# Patient Record
Sex: MALE | Race: WHITE | Employment: UNEMPLOYED | URBAN - METROPOLITAN AREA
[De-identification: names, ages, dates, MRNs, and addresses within clinical notes are randomized per-mention and may not be internally consistent; named-entity substitution may affect disease eponyms.]

---

## 2017-03-18 ENCOUNTER — APPOINTMENT (OUTPATIENT)
Dept: MRI IMAGING | Facility: HOSPITAL | Age: 32
End: 2017-03-18
Attending: EMERGENCY MEDICINE
Payer: COMMERCIAL

## 2017-03-18 ENCOUNTER — HOSPITAL ENCOUNTER (EMERGENCY)
Facility: HOSPITAL | Age: 32
Discharge: HOME | End: 2017-03-18
Attending: EMERGENCY MEDICINE
Payer: COMMERCIAL

## 2017-03-18 VITALS
WEIGHT: 265 LBS | TEMPERATURE: 98 F | HEIGHT: 71 IN | SYSTOLIC BLOOD PRESSURE: 149 MMHG | DIASTOLIC BLOOD PRESSURE: 84 MMHG | HEART RATE: 81 BPM | OXYGEN SATURATION: 99 % | RESPIRATION RATE: 20 BRPM | BODY MASS INDEX: 37.1 KG/M2

## 2017-03-18 DIAGNOSIS — G89.29 CHRONIC BILATERAL BACK PAIN, UNSPECIFIED BACK LOCATION: ICD-10-CM

## 2017-03-18 DIAGNOSIS — M54.9 CHRONIC BILATERAL BACK PAIN, UNSPECIFIED BACK LOCATION: ICD-10-CM

## 2017-03-18 DIAGNOSIS — M54.16 LUMBAR RADICULOPATHY: ICD-10-CM

## 2017-03-18 DIAGNOSIS — M54.10 ACUTE LOW BACK PAIN WITH RADICULAR SYMPTOMS, DURATION LESS THAN 6 WEEKS: Primary | ICD-10-CM

## 2017-03-18 LAB
ANION GAP SERPL CALC-SCNC: 10 MMOL/L (ref 0–18)
BASOPHILS # BLD: 0 K/UL (ref 0–0.2)
BASOPHILS NFR BLD: 0 %
BILIRUB UR QL: NEGATIVE
BUN SERPL-MCNC: 9 MG/DL (ref 8–20)
BUN/CREAT SERPL: 8.3 (ref 10–20)
CALCIUM SERPL-MCNC: 9.1 MG/DL (ref 8.5–10.5)
CHLORIDE SERPL-SCNC: 106 MMOL/L (ref 95–110)
CLARITY UR: CLEAR
CO2 SERPL-SCNC: 22 MMOL/L (ref 22–32)
COLOR UR: YELLOW
CREAT SERPL-MCNC: 1.09 MG/DL (ref 0.5–1.5)
EOSINOPHIL # BLD: 0 K/UL (ref 0–0.7)
EOSINOPHIL NFR BLD: 1 %
ERYTHROCYTE [DISTWIDTH] IN BLOOD BY AUTOMATED COUNT: 13.3 % (ref 11–15)
GLUCOSE SERPL-MCNC: 110 MG/DL (ref 70–99)
GLUCOSE UR-MCNC: NEGATIVE MG/DL
HCT VFR BLD AUTO: 45.8 % (ref 41–52)
HGB BLD-MCNC: 15.7 G/DL (ref 13.5–17.5)
HGB UR QL STRIP.AUTO: NEGATIVE
LEUKOCYTE ESTERASE UR QL STRIP.AUTO: NEGATIVE
LYMPHOCYTES # BLD: 1 K/UL (ref 1–4)
LYMPHOCYTES NFR BLD: 21 %
MCH RBC QN AUTO: 29.3 PG (ref 27–32)
MCHC RBC AUTO-ENTMCNC: 34.3 G/DL (ref 32–37)
MCV RBC AUTO: 85.5 FL (ref 80–100)
MONOCYTES # BLD: 0.7 K/UL (ref 0–1)
MONOCYTES NFR BLD: 16 %
NEUTROPHILS # BLD AUTO: 3 K/UL (ref 1.8–7.7)
NEUTROPHILS NFR BLD: 62 %
NITRITE UR QL STRIP.AUTO: NEGATIVE
OSMOLALITY UR CALC.SUM OF ELEC: 285 MOSM/KG (ref 275–295)
PH UR: 7 [PH] (ref 5–8)
PLATELET # BLD AUTO: 162 K/UL (ref 140–400)
PMV BLD AUTO: 10.5 FL (ref 7.4–10.3)
POTASSIUM SERPL-SCNC: 3.6 MMOL/L (ref 3.3–5.1)
PROT UR-MCNC: NEGATIVE MG/DL
RBC # BLD AUTO: 5.35 M/UL (ref 4.5–5.9)
SODIUM SERPL-SCNC: 138 MMOL/L (ref 136–144)
SP GR UR STRIP: 1.01 (ref 1–1.03)
UROBILINOGEN UR STRIP-ACNC: <2
VIT C UR-MCNC: NEGATIVE MG/DL
WBC # BLD AUTO: 4.8 K/UL (ref 4–11)

## 2017-03-18 PROCEDURE — 72141 MRI NECK SPINE W/O DYE: CPT

## 2017-03-18 PROCEDURE — 72148 MRI LUMBAR SPINE W/O DYE: CPT

## 2017-03-18 PROCEDURE — 96372 THER/PROPH/DIAG INJ SC/IM: CPT

## 2017-03-18 PROCEDURE — 96374 THER/PROPH/DIAG INJ IV PUSH: CPT

## 2017-03-18 PROCEDURE — 81003 URINALYSIS AUTO W/O SCOPE: CPT | Performed by: EMERGENCY MEDICINE

## 2017-03-18 PROCEDURE — 72146 MRI CHEST SPINE W/O DYE: CPT

## 2017-03-18 PROCEDURE — 99285 EMERGENCY DEPT VISIT HI MDM: CPT

## 2017-03-18 PROCEDURE — 80048 BASIC METABOLIC PNL TOTAL CA: CPT | Performed by: EMERGENCY MEDICINE

## 2017-03-18 PROCEDURE — 85025 COMPLETE CBC W/AUTO DIFF WBC: CPT | Performed by: EMERGENCY MEDICINE

## 2017-03-18 RX ORDER — HYDROMORPHONE HYDROCHLORIDE 1 MG/ML
1 INJECTION, SOLUTION INTRAMUSCULAR; INTRAVENOUS; SUBCUTANEOUS ONCE
Status: COMPLETED | OUTPATIENT
Start: 2017-03-18 | End: 2017-03-18

## 2017-03-18 RX ORDER — HYDROMORPHONE HYDROCHLORIDE 2 MG/1
2 TABLET ORAL EVERY 4 HOURS PRN
Qty: 20 TABLET | Refills: 0 | Status: SHIPPED | OUTPATIENT
Start: 2017-03-18 | End: 2017-03-25

## 2017-03-18 NOTE — ED PROVIDER NOTES
Patient Seen in: [facility] AND [facility] Emergency Department    History   Patient presents with:  Back Pain (musculoskeletal)    Stated Complaint: back and leg pain: history of spinal surgery in May    HPI    77-year-old male with history of type I neurofibro Father      Aortic valve replacement   • Hypertension Father    • Hypertension Mother    • Breast Cancer Mother    • Diabetes Maternal Grandfather    • Neurofibromatosis [Other] Abdulkadir Wolff Mother    • Heart Disease Paternal Grandfather      Aortic valve repla lumbar back with no increased tenderness to the midline. No overlying skin changes noted.   Psychiatric: patient is oriented X 3, there is no agitation    DIFFERENTIAL DIAGNOSIS: After history and physical exam differential diagnosis was considered for rad medications    HYDROmorphone HCl 2 MG Oral Tab  Take 1 tablet (2 mg total) by mouth every 4 (four) hours as needed for Pain.   Qty: 20 tablet Refills: 0

## 2017-03-18 NOTE — ED NOTES
Pt presents to ED via [facility] with c/o back pain x 2 days- states was at a migue's house last night and mid back pain became unbearable with bilateral leg numbess- walked outside because \"my legs felt like they are on fire. \" Pt states back surgery last may \"

## 2017-10-19 ENCOUNTER — OFFICE VISIT (OUTPATIENT)
Dept: INTERNAL MEDICINE CLINIC | Facility: CLINIC | Age: 32
End: 2017-10-19

## 2017-10-19 ENCOUNTER — LAB ENCOUNTER (OUTPATIENT)
Dept: LAB | Facility: HOSPITAL | Age: 32
End: 2017-10-19
Attending: PHYSICIAN ASSISTANT
Payer: COMMERCIAL

## 2017-10-19 ENCOUNTER — NURSE TRIAGE (OUTPATIENT)
Dept: OTHER | Age: 32
End: 2017-10-19

## 2017-10-19 VITALS
SYSTOLIC BLOOD PRESSURE: 136 MMHG | RESPIRATION RATE: 18 BRPM | WEIGHT: 236 LBS | TEMPERATURE: 98 F | HEART RATE: 90 BPM | BODY MASS INDEX: 33.04 KG/M2 | HEIGHT: 71 IN | DIASTOLIC BLOOD PRESSURE: 88 MMHG

## 2017-10-19 DIAGNOSIS — R11.2 NON-INTRACTABLE VOMITING WITH NAUSEA, UNSPECIFIED VOMITING TYPE: ICD-10-CM

## 2017-10-19 DIAGNOSIS — R11.2 NON-INTRACTABLE VOMITING WITH NAUSEA, UNSPECIFIED VOMITING TYPE: Primary | ICD-10-CM

## 2017-10-19 PROCEDURE — 85025 COMPLETE CBC W/AUTO DIFF WBC: CPT

## 2017-10-19 PROCEDURE — 85060 BLOOD SMEAR INTERPRETATION: CPT

## 2017-10-19 PROCEDURE — 84443 ASSAY THYROID STIM HORMONE: CPT

## 2017-10-19 PROCEDURE — 80061 LIPID PANEL: CPT

## 2017-10-19 PROCEDURE — 99214 OFFICE O/P EST MOD 30 MIN: CPT | Performed by: PHYSICIAN ASSISTANT

## 2017-10-19 PROCEDURE — 36415 COLL VENOUS BLD VENIPUNCTURE: CPT

## 2017-10-19 PROCEDURE — 80053 COMPREHEN METABOLIC PANEL: CPT

## 2017-10-19 RX ORDER — ONDANSETRON 4 MG/1
4 TABLET, FILM COATED ORAL EVERY 8 HOURS PRN
Qty: 20 TABLET | Refills: 0 | Status: ON HOLD | OUTPATIENT
Start: 2017-10-19 | End: 2017-11-01

## 2017-10-19 RX ORDER — HYDROCODONE BITARTRATE AND ACETAMINOPHEN 10; 325 MG/1; MG/1
1 TABLET ORAL 2 TIMES DAILY PRN
COMMUNITY
Start: 2016-05-10

## 2017-10-19 NOTE — PROGRESS NOTES
HPI:    Patient ID: Loni Gamble is a 29year old male. Patient presents with nausea and vomiting for 2 days. States he went to gym yesterday and spent 5-10 minutes in hot tub when he abruptly became nauseous.  Had 2 episodes of NBNB emesis at the gym Dialysis      Smoking status: Never Smoker                                                              Alcohol use: Yes           0.0 oz/week     Comment: Occasional         Review of Systems   Constitutional: Positive for chills and fatigue.  Negative for Tympanic membrane and external ear normal.   Nose: Nose normal.   Mouth/Throat: Oropharynx is clear and moist.   Eyes: Conjunctivae and EOM are normal. Pupils are equal, round, and reactive to light. Neck: Normal range of motion. Neck supple.  No thyromeg (Completed)    ASSAY, THYROID STIM HORMONE (Completed)    LIPID PANEL (Completed)      Other Visit Diagnoses    None.            Orders Placed This Encounter      CBC W Differential W Platelet [E]      Comp Metabolic Panel (14) [E]      TSH [E]      Lipid P

## 2017-10-19 NOTE — TELEPHONE ENCOUNTER
Action Requested: Summary for Provider     []  Critical Lab, Recommendations Needed  [] Need Additional Advice  []   FYI    []   Need Orders  [] Need Medications Sent to Pharmacy  []  Other     SUMMARY: Appointment made with Jannette Grissom today 10/19/1

## 2017-10-19 NOTE — ASSESSMENT & PLAN NOTE
Has had nausea/vomiting intermittently for few years, worse now. Etiology unclear, does not appear to be infectious or medication induced. No obvious organic cause. Could possibly be related to chronic cannabis use.  Has hx of neurofibromatosis, repeat imag

## 2017-10-20 ENCOUNTER — TELEPHONE (OUTPATIENT)
Dept: OTHER | Age: 32
End: 2017-10-20

## 2017-10-20 NOTE — TELEPHONE ENCOUNTER
Noted and agree. If he continues to have intractable vomiting after another 1-2 days or if he develops bloody emesis, fevers, or dehydration, would advise he go to emergency room. Otherwise can f/u with me early next week.

## 2017-10-20 NOTE — TELEPHONE ENCOUNTER
DWAINE King.:  Please note, discussed lab letter with patient. He called for the results, and understood labs. He states he tried eating yesterday, and vomited. Still has some vomiting today. Stomach still feels uneasy and nauseated.   Denied fe

## 2017-10-24 ENCOUNTER — OFFICE VISIT (OUTPATIENT)
Dept: INTERNAL MEDICINE CLINIC | Facility: CLINIC | Age: 32
End: 2017-10-24

## 2017-10-24 VITALS
HEART RATE: 92 BPM | BODY MASS INDEX: 33.22 KG/M2 | WEIGHT: 237.31 LBS | HEIGHT: 71 IN | RESPIRATION RATE: 16 BRPM | SYSTOLIC BLOOD PRESSURE: 138 MMHG | DIASTOLIC BLOOD PRESSURE: 82 MMHG

## 2017-10-24 DIAGNOSIS — R41.3 MEMORY LOSS OF UNKNOWN CAUSE: Primary | ICD-10-CM

## 2017-10-24 PROCEDURE — 99213 OFFICE O/P EST LOW 20 MIN: CPT | Performed by: PHYSICIAN ASSISTANT

## 2017-10-24 NOTE — PROGRESS NOTES
HPI:    Patient ID: Schuyler Brittle is a 29year old male. Patient presents today for follow up of nausea and vomiting. He was seen in clinic 5 days ago for gastroenteritis and prescribed ondansetron as needed.  Has not had any episodes of vomiting in th Disease Father      Aortic valve replacement   • Hypertension Father    • Hypertension Mother    • Breast Cancer Mother    • Neurofibromatosis [OTHER] Mother    • Diabetes Maternal Grandfather    • Heart Disease Paternal Grandfather      Aortic valve repla needed for Nausea. Disp: 20 tablet Rfl: 0   baclofen 10 MG Oral Tab Take 1 tablet (10 mg total) by mouth 3 (three) times daily.  Disp: 60 tablet Rfl: 3     Allergies:  Augmentin, [Amoxici*    Swelling   PHYSICAL EXAM:   Physical Exam   Nursing note and delvin plenty of fluids to stay hydrated. Advised regular exercise (elliptical, bicycle, swimming) 30 mintues 4-5 times per week. Advised to stop smoking marijuana. Has had recent brain MRI at OSH, patient to obtain medical records from [address] [place] [address].   Advised patient to

## 2017-10-24 NOTE — TELEPHONE ENCOUNTER
States vomiting stopped about two days ago but continued to feel Nauseas. States can not remember the weekend d/t being so sick. Pt offered appt with Luis Manuel Mireles today 10/24/17 at 1100. Pt verbalized understanding and agreement to appt scheduled.  No further acti

## 2017-10-24 NOTE — PATIENT INSTRUCTIONS
Problem List Items Addressed This Visit        Other    Memory loss of unknown cause - Primary     Has had recent difficulty with memory and concentration, worse in the last few weeks. Etiology unclear, could be due to progression of neurofibromatosis.  Has

## 2017-11-01 ENCOUNTER — TELEPHONE (OUTPATIENT)
Dept: OTHER | Age: 32
End: 2017-11-01

## 2017-11-01 ENCOUNTER — HOSPITAL ENCOUNTER (OUTPATIENT)
Facility: HOSPITAL | Age: 32
Setting detail: OBSERVATION
Discharge: HOME | End: 2017-11-02
Attending: EMERGENCY MEDICINE | Admitting: HOSPITALIST
Payer: COMMERCIAL

## 2017-11-01 ENCOUNTER — APPOINTMENT (OUTPATIENT)
Dept: GENERAL RADIOLOGY | Facility: HOSPITAL | Age: 32
End: 2017-11-01
Attending: EMERGENCY MEDICINE
Payer: COMMERCIAL

## 2017-11-01 DIAGNOSIS — R61 DIAPHORESIS: Primary | ICD-10-CM

## 2017-11-01 DIAGNOSIS — R68.89 RIGORS: ICD-10-CM

## 2017-11-01 DIAGNOSIS — Q85.00 NEUROFIBROMATOSIS (HCC): ICD-10-CM

## 2017-11-01 PROCEDURE — 99220 INITIAL OBSERVATION CARE,LEVL III: CPT | Performed by: HOSPITALIST

## 2017-11-01 PROCEDURE — 71010 XR CHEST AP PORTABLE  (CPT=71010): CPT | Performed by: EMERGENCY MEDICINE

## 2017-11-01 RX ORDER — MORPHINE SULFATE 30 MG/1
30 TABLET, FILM COATED, EXTENDED RELEASE ORAL NIGHTLY PRN
COMMUNITY

## 2017-11-01 RX ORDER — ACETAMINOPHEN 325 MG/1
650 TABLET ORAL EVERY 6 HOURS PRN
Status: DISCONTINUED | OUTPATIENT
Start: 2017-11-01 | End: 2017-11-01

## 2017-11-01 RX ORDER — ACETAMINOPHEN 500 MG
1000 TABLET ORAL EVERY 6 HOURS PRN
Status: DISCONTINUED | OUTPATIENT
Start: 2017-11-01 | End: 2017-11-02

## 2017-11-01 RX ORDER — MORPHINE SULFATE 2 MG/ML
1 INJECTION, SOLUTION INTRAMUSCULAR; INTRAVENOUS EVERY 2 HOUR PRN
Status: DISCONTINUED | OUTPATIENT
Start: 2017-11-01 | End: 2017-11-02

## 2017-11-01 RX ORDER — HYDROCODONE BITARTRATE AND ACETAMINOPHEN 10; 325 MG/1; MG/1
1 TABLET ORAL 2 TIMES DAILY PRN
Status: DISCONTINUED | OUTPATIENT
Start: 2017-11-01 | End: 2017-11-02

## 2017-11-01 RX ORDER — MORPHINE SULFATE 30 MG/1
30 TABLET, FILM COATED, EXTENDED RELEASE ORAL NIGHTLY PRN
Status: DISCONTINUED | OUTPATIENT
Start: 2017-11-01 | End: 2017-11-01

## 2017-11-01 RX ORDER — LORAZEPAM 2 MG/ML
1 INJECTION INTRAMUSCULAR ONCE
Status: COMPLETED | OUTPATIENT
Start: 2017-11-01 | End: 2017-11-01

## 2017-11-01 RX ORDER — SODIUM CHLORIDE 0.9 % (FLUSH) 0.9 %
3 SYRINGE (ML) INJECTION AS NEEDED
Status: DISCONTINUED | OUTPATIENT
Start: 2017-11-01 | End: 2017-11-02

## 2017-11-01 RX ORDER — ACETAMINOPHEN 500 MG
1000 TABLET ORAL EVERY 6 HOURS PRN
COMMUNITY

## 2017-11-01 RX ORDER — ONDANSETRON 2 MG/ML
4 INJECTION INTRAMUSCULAR; INTRAVENOUS EVERY 6 HOURS PRN
Status: DISCONTINUED | OUTPATIENT
Start: 2017-11-01 | End: 2017-11-02

## 2017-11-01 RX ORDER — MORPHINE SULFATE 2 MG/ML
2 INJECTION, SOLUTION INTRAMUSCULAR; INTRAVENOUS EVERY 2 HOUR PRN
Status: DISCONTINUED | OUTPATIENT
Start: 2017-11-01 | End: 2017-11-02

## 2017-11-01 RX ORDER — MORPHINE SULFATE 4 MG/ML
4 INJECTION, SOLUTION INTRAMUSCULAR; INTRAVENOUS EVERY 2 HOUR PRN
Status: DISCONTINUED | OUTPATIENT
Start: 2017-11-01 | End: 2017-11-02

## 2017-11-01 RX ORDER — ONDANSETRON 2 MG/ML
4 INJECTION INTRAMUSCULAR; INTRAVENOUS ONCE
Status: COMPLETED | OUTPATIENT
Start: 2017-11-01 | End: 2017-11-01

## 2017-11-01 NOTE — ED PROVIDER NOTES
Patient Seen in: [facility] AND [facility] Emergency Department    History   Patient presents with:  Nausea/Vomiting/Diarrhea (gastrointestinal)    Stated Complaint: shakiness, tremors, back pain, chills    HPI    29-year-old male with history of neurofibromatos Smokeless tobacco: Never Used                      Alcohol use: Yes           0.0 oz/week     Comment: Occasional      Review of Systems    Positive for stated complaint: shakiness, tremors, back pain, chills  Other systems are as not components within normal limits   CBC W/ DIFFERENTIAL - Abnormal; Notable for the following:     WBC 15.2 (*)     RBC 6.23 (*)     HGB 18.2 (*)     HCT 53.0 (*)     MPV 10.9 (*)     Neutrophil Absolute 13.0 (*)     All other components within normal limits R61 11/1/2017 Unknown

## 2017-11-01 NOTE — H&P
[facility]    PATIENT'S NAME: Nathalie MEYERS   ATTENDING PHYSICIAN: Jono Choi MD   PATIENT ACCOUNT#:   [id number]    LOCATION:  [place] [address]  MEDICAL RECORD #:   [id number]       YOB: 1985  ADMISSION DATE:       11/01/2 last 2 weeks, associated with palpitations and intense anxiety. Other 12-point review of systems is negative. PHYSICAL EXAMINATION:    GENERAL:  Alert and oriented to time, place, and person. Slightly diaphoretic.     VITAL SIGNS:  Temperature 98.2,

## 2017-11-01 NOTE — ED INITIAL ASSESSMENT (HPI)
Seen in clinic 10/19/2017 for gastroenteritis, seen again for \"memory loss\" 10/24/2017 related to possible virus. Has been seen by neurology and they are doing a work-up to r/o seizures.   Awoke this morning with chills, back pain, having episodes of swe

## 2017-11-01 NOTE — PLAN OF CARE
Problem: Patient Centered Care  Goal: Patient preferences are identified and integrated in the patient's plan of care  Interventions:  - What would you like us to know as we care for you?  Keep me informed  - Provide timely, complete, and accurate informati

## 2017-11-01 NOTE — TELEPHONE ENCOUNTER
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 Today his back is extra hurting and he has uncontrolled shaking and dizziness with having the chills. These are not new symptoms but today are not stopping.    I discussed with Jorge Land

## 2017-11-02 ENCOUNTER — APPOINTMENT (OUTPATIENT)
Dept: CT IMAGING | Facility: HOSPITAL | Age: 32
End: 2017-11-02
Attending: INTERNAL MEDICINE
Payer: COMMERCIAL

## 2017-11-02 VITALS
HEART RATE: 98 BPM | DIASTOLIC BLOOD PRESSURE: 91 MMHG | BODY MASS INDEX: 33.7 KG/M2 | OXYGEN SATURATION: 97 % | SYSTOLIC BLOOD PRESSURE: 143 MMHG | WEIGHT: 240.69 LBS | TEMPERATURE: 99 F | HEIGHT: 71 IN | RESPIRATION RATE: 17 BRPM

## 2017-11-02 PROCEDURE — 74170 CT ABD WO CNTRST FLWD CNTRST: CPT | Performed by: INTERNAL MEDICINE

## 2017-11-02 PROCEDURE — 74178 CT ABD&PLV WO CNTR FLWD CNTR: CPT | Performed by: INTERNAL MEDICINE

## 2017-11-02 PROCEDURE — 99217 OBSERVATION CARE DISCHARGE: CPT | Performed by: HOSPITALIST

## 2017-11-02 PROCEDURE — 99219 INITIAL OBSERVATION CARE,LEVL II: CPT | Performed by: INTERNAL MEDICINE

## 2017-11-02 NOTE — CONSULTS
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

## 2017-11-02 NOTE — PLAN OF CARE
Problem: Patient Centered Care  Goal: Patient preferences are identified and integrated in the patient's plan of care  Interventions:  - What would you like us to know as we care for you?  Keep me informed  - Provide timely, complete, and accurate informati assessment  - Modify environment to reduce risk of injury  - Provide assistive devices as appropriate  - Consider OT/PT consult to assist with strengthening/mobility  - Encourage toileting schedule  Outcome: Progressing  Pt is aox4, calls appropriately as

## 2017-11-02 NOTE — PLAN OF CARE
Patient Centered Care    • Patient preferences are identified and integrated in the patient's plan of care Completed        Patient/Family Goals    • Patient/Family Long Term Goal Completed    • Patient/Family Short Term Goal Completed        SAFETY ADULT

## 2017-11-02 NOTE — PROGRESS NOTES
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

## 2017-11-02 NOTE — DISCHARGE SUMMARY
[facility]    Discharge Summary    Eliot Reed Patient Status:  Observation    1985 MRN [id number]   Location [facility] 5SW/SE Attending Festus Harris MD   Hosp Day # 0 PCP Bonita Puckett MD     Date of Admission department with diaphoresis. White blood cell count of 15.2, hemoglobin of 18.2. Chemistry showed bicarb of 19. Chest x-ray showed no acute findings. Nasopharyngeal swab for viral panel was negative.   The patient will be admitted to the hospital for fu

## 2017-11-03 ENCOUNTER — TELEPHONE (OUTPATIENT)
Dept: ENDOCRINOLOGY CLINIC | Facility: CLINIC | Age: 32
End: 2017-11-03

## 2017-11-03 ENCOUNTER — TELEPHONE (OUTPATIENT)
Dept: INTERNAL MEDICINE UNIT | Facility: HOSPITAL | Age: 32
End: 2017-11-03

## 2017-11-03 NOTE — TELEPHONE ENCOUNTER
Please call patient and schedule f/u appt in the week of Nov 13 to review test results from hospitalization.

## 2017-11-13 ENCOUNTER — OFFICE VISIT (OUTPATIENT)
Dept: ENDOCRINOLOGY CLINIC | Facility: CLINIC | Age: 32
End: 2017-11-13

## 2017-11-13 ENCOUNTER — OFFICE VISIT (OUTPATIENT)
Dept: INTERNAL MEDICINE CLINIC | Facility: CLINIC | Age: 32
End: 2017-11-13

## 2017-11-13 VITALS
SYSTOLIC BLOOD PRESSURE: 126 MMHG | HEART RATE: 78 BPM | HEIGHT: 71 IN | BODY MASS INDEX: 32.9 KG/M2 | DIASTOLIC BLOOD PRESSURE: 74 MMHG | WEIGHT: 235 LBS

## 2017-11-13 VITALS
HEIGHT: 71 IN | SYSTOLIC BLOOD PRESSURE: 143 MMHG | BODY MASS INDEX: 32.9 KG/M2 | HEART RATE: 71 BPM | DIASTOLIC BLOOD PRESSURE: 82 MMHG | WEIGHT: 235 LBS

## 2017-11-13 DIAGNOSIS — R00.2 PALPITATIONS: ICD-10-CM

## 2017-11-13 DIAGNOSIS — R61 DIAPHORESIS: Primary | ICD-10-CM

## 2017-11-13 DIAGNOSIS — R68.89 RIGORS: Primary | ICD-10-CM

## 2017-11-13 PROCEDURE — 99212 OFFICE O/P EST SF 10 MIN: CPT | Performed by: INTERNAL MEDICINE

## 2017-11-13 PROCEDURE — 99213 OFFICE O/P EST LOW 20 MIN: CPT | Performed by: INTERNAL MEDICINE

## 2017-11-13 NOTE — PROGRESS NOTES
Ameena Garcia is a 29year old male. Patient presents with:  Hospital F/U    HPI:   Ashwini Mei presents this afternoon, accompanied by his mother, for hospital follow-up.     On November 1, he had the sudden onset of shakes and sweats, with a feeling of \"fuzz insufficiency     Echo 4-11 with moderate AI, normal LV   • Neurofibromatosis ([address]      Past Surgical History:  No date: EXCIS LUMBAR DISK,ONE LEVEL      Comment: c1 tumor removed and c6-c7 fusion.    Childhood: EYE SURGERY Left      Comment: Strabismus  2

## 2017-11-13 NOTE — PROGRESS NOTES
Name: Lashanda Soler  Date: 11/13/2017    Referring Physician: No ref. provider found    No chief complaint on file. HISTORY OF PRESENT ILLNESS   Lashanda Soler is a 29year old male who presents for palpitations.     30 y/o M with h/o NF1 presents f [facility]                                   Social History Main Topics    Smoking status: Never Smoker                                                                Smokeless tobacco: Never Used                        Alcohol use:  No                 Com motor grossly intact  Psychiatric:  oriented to time, self, and place  Nutritional:  no abnormal weight gain or loss    Reviewed labs from hospitalization and CT scan    ASSESSMENT/PLAN:    1.  Palpitations  -Overall symptoms improved  -Reviewed all the res

## 2018-04-25 ENCOUNTER — OFFICE VISIT (OUTPATIENT)
Dept: CARDIOLOGY CLINIC | Facility: CLINIC | Age: 33
End: 2018-04-25

## 2018-04-25 ENCOUNTER — TELEPHONE (OUTPATIENT)
Dept: CARDIOLOGY CLINIC | Facility: CLINIC | Age: 33
End: 2018-04-25

## 2018-04-25 VITALS
OXYGEN SATURATION: 99 % | SYSTOLIC BLOOD PRESSURE: 118 MMHG | RESPIRATION RATE: 16 BRPM | WEIGHT: 242 LBS | DIASTOLIC BLOOD PRESSURE: 80 MMHG | BODY MASS INDEX: 34 KG/M2 | HEART RATE: 75 BPM

## 2018-04-25 DIAGNOSIS — I35.1 NONRHEUMATIC AORTIC VALVE INSUFFICIENCY: Primary | ICD-10-CM

## 2018-04-25 PROCEDURE — 99212 OFFICE O/P EST SF 10 MIN: CPT | Performed by: INTERNAL MEDICINE

## 2018-04-25 PROCEDURE — 99214 OFFICE O/P EST MOD 30 MIN: CPT | Performed by: INTERNAL MEDICINE

## 2018-04-25 RX ORDER — AMITRIPTYLINE HYDROCHLORIDE 25 MG/1
25 TABLET, FILM COATED ORAL NIGHTLY
COMMUNITY

## 2018-04-25 NOTE — PATIENT INSTRUCTIONS
Continue current medications. 2D echocardiogram within the next few weeks. Follow-up with me in 12 months or sooner if needed.

## 2018-04-25 NOTE — PROGRESS NOTES
Schuyler Brittle is a 29year old male. Patient presents with: Follow - Up: Feels jittery, Shortness of breath    HPI:   Patient is here for follow-up appointment.   He was seen 2 years ago, has history of possible bicuspid aortic valve with mild-to-moderate cyanosis, clubbing or edema    Assessment   ASSESSMENT AND PLAN:     Problem List Items Addressed This Visit     Aortic insufficiency - Primary    Relevant Orders    CARD ECHO 2D DOPPLER (CPT=93306)          No cardiac symptoms, repeat 2D echocardiogram.

## 2018-04-30 NOTE — TELEPHONE ENCOUNTER
Call was placed to [employer] for prior auth., [facility] [employer] and no prior authorization is needed for Echo.

## 2018-05-18 ENCOUNTER — HOSPITAL ENCOUNTER (OUTPATIENT)
Dept: CARDIOLOGY CLINIC | Age: 33
Discharge: HOME | End: 2018-05-18
Attending: INTERNAL MEDICINE
Payer: COMMERCIAL

## 2018-05-18 DIAGNOSIS — I35.1 NONRHEUMATIC AORTIC VALVE INSUFFICIENCY: ICD-10-CM

## 2018-05-18 PROCEDURE — 93306 TTE W/DOPPLER COMPLETE: CPT | Performed by: INTERNAL MEDICINE

## (undated) NOTE — LETTER
October 20, 2017     [facility]      Dear Mary Jane Duran:    Below are the results from your recent visit:    Resulted Orders   COMP METABOLIC PANEL (14)   Result Value Ref Range    Glucose 102 (H) 70 - 99 mg/dL The blood cell count is normal. There is no evidence of anemia or infection. The blood sugar (glucose) level is normal. There is no evidence of diabetes.     The electrolytes levels in the blood are normal.    The kidney and liver function tests are all

## (undated) NOTE — ED AVS SNAPSHOT
[facility] Emergency Department    Deepika [address]     [place] [address]    Phone:  [phone]    Fax:  [facility]   MRN: [id number]    Department:  [facility] Emergency Department   Date of Visit:  3/18 and Class Registration line at [phone] or find a doctor online by visiting [email or web address].    IF THERE IS ANY CHANGE OR WORSENING OF YOUR CONDITION, CALL YOUR PRIMARY CARE PHYSICIAN AT ONCE OR RETURN IMMEDIATELY TO [address] [place] [address].     If

## (undated) NOTE — LETTER
October 20, 2017     [address] [place] [address]      Dear Sona Proud:    Below are the results from your recent visit:    Resulted Orders   COMP METABOLIC PANEL (14)   Result Value Ref Range    Glucose 102 (H) 70 - 99 mg/dL The red blood cell count is near normal. There is no evidence of anemia. The white blood cell count is slightly elevated, which can be due to infection. No further testing is needed at this time.      The blood sugar (glucose) level is near normal. There is

## (undated) NOTE — ED AVS SNAPSHOT
[facility] Emergency Department    Deepika [address]     [place] [address]    Phone:  [phone]    Fax:  [place]   MRN: [id number]    Department:  [facility] Emergency Department   Date of Visit:  3/18 Continue gabapentin and baclofen as previously prescribed. Take Dilaudid as prescribed, as needed for pain. Follow-up with your pain specialist as discussed for further treatment.   Return to the emergency department if weakness, incontinence, increasing and Class Registration line at [phone] or find a doctor online by visiting [email or web address].    IF THERE IS ANY CHANGE OR WORSENING OF YOUR CONDITION, CALL YOUR PRIMARY CARE PHYSICIAN AT ONCE OR RETURN IMMEDIATELY TO [address] [place] [address].     If you to explore options for quitting.     - If you have concerns related to behavioral health issues or thoughts of harming yourself, contact [facility] at [phone].     - If you don’t have insurance, Marleny Gordon